# Patient Record
Sex: FEMALE | URBAN - METROPOLITAN AREA
[De-identification: names, ages, dates, MRNs, and addresses within clinical notes are randomized per-mention and may not be internally consistent; named-entity substitution may affect disease eponyms.]

---

## 2022-08-10 ENCOUNTER — OFFICE VISIT (OUTPATIENT)
Dept: ORTHOPEDIC SURGERY | Age: 47
End: 2022-08-10
Payer: OTHER GOVERNMENT

## 2022-08-10 VITALS — HEIGHT: 64 IN | BODY MASS INDEX: 29.88 KG/M2 | WEIGHT: 175 LBS

## 2022-08-10 DIAGNOSIS — M54.16 LUMBAR RADICULOPATHY: ICD-10-CM

## 2022-08-10 DIAGNOSIS — R52 PAIN: Primary | ICD-10-CM

## 2022-08-10 PROCEDURE — 99204 OFFICE O/P NEW MOD 45 MIN: CPT | Performed by: PHYSICIAN ASSISTANT

## 2022-08-10 NOTE — PROGRESS NOTES
New Patient: LUMBAR SPINE    Referring Provider:  No ref. provider found    CHIEF COMPLAINT:    Chief Complaint   Patient presents with    Back Pain     lumbar         HISTORY OF PRESENT ILLNESS:       Ms. Munira Alonzo  is a pleasant 50 y.o. female here for consultation regarding her LBP and left leg pain. She states her pain began when she slipped down carpeted steps impacting her left buttock onto a step on 6/9/2022. Avelina Baxter Her pain has steadily continued since then. She rates her back pain 0/10, left buttock pain 3/10 with paresthesias into her left leg . She describes the pain as intermittent. Pain is worse with prolonged sitting, rising from a seated position and improved some with changing positions, standing, walking, lying down. The leg pain radiates down the anterior lateral aspect of her left leg to her foot. She has numbness and tingling in her left leg. She denies weakness of her left or right leg and denies bowel or bladder dysfunction. Pain minimally interferes with her sleep. Patient has recently seen her PCP and was placed on a prednisone taper along with a muscle relaxant which has improved her symptoms. Pain Assessment  Location of Pain: Back  Severity of Pain: 0]  Current/Past Treatment:   Physical Therapy: None  Chiropractic: 2 visits with some benefit  Injection: None  Medications: Prednisone taper and muscle relaxant    Past Medical History:   History reviewed. No pertinent past medical history. Past Surgical History:     History reviewed. No pertinent surgical history. Current Medications:   No current outpatient medications on file. Allergies:  Patient has no known allergies. Social History:    reports that she has never smoked. She has never used smokeless tobacco.    Family History:   History reviewed. No pertinent family history.     REVIEW OF SYSTEMS: Full ROS noted & scanned   CONSTITUTIONAL: Denies unexplained weight loss, fevers, chills or fatigue  NEUROLOGICAL: Denies not show any tenderness, deformity or injury. Range of motion is unremarkable. There is no gross instability. There are no rashes, ulcerations or lesions. Strength and tone are normal.  LEFT LOWER EXTREMITY:  Inspection/examination of the left lower extremity does not show any tenderness, deformity or injury. Range of motion is unremarkable. There is no gross instability. There are no rashes, ulcerations or lesions. Strength and tone are normal.    Diagnostic Testing:    X-rays: 2 views of the lumbar spine that were obtained today in the office and independently reviewed with the patient's shows well-maintained lumbar lordosis with only mild spondylosis. There is mild disc space narrowing at L4-5 and L5-S1. Impression:   Lumbar radiculopathy    1. Pain        Plan:      We discussed the diagnosis and treatment options including observation, additional oral steroids, physical therapy, epidural injections and additional imaging. She wishes to proceed with patient physical therapy for lumbar flexibility, pelvic stabilization, and core exercises. Modalities of choice will be added to include dry needling. If the patient has no significant improvement with these conservative treatment she will contact the office for scheduling of lumbar MRI. .    Follow up -as needed    Old records were reviewed.     Usman Barron PA-C  Board certified by the [facility]

## 2022-08-15 LAB — MAMMOGRAPHY, EXTERNAL: NORMAL

## 2022-08-16 ENCOUNTER — HOSPITAL ENCOUNTER (OUTPATIENT)
Dept: PHYSICAL THERAPY | Age: 47
Setting detail: THERAPIES SERIES
Discharge: HOME | End: 2022-08-16
Payer: OTHER GOVERNMENT

## 2022-08-16 PROCEDURE — 97161 PT EVAL LOW COMPLEX 20 MIN: CPT

## 2022-08-16 PROCEDURE — 97112 NEUROMUSCULAR REEDUCATION: CPT

## 2022-08-16 PROCEDURE — 97110 THERAPEUTIC EXERCISES: CPT

## 2022-08-16 NOTE — FLOWSHEET NOTE
Manual: STM to gluts/ piriformis    Therapeutic Exercise and NMR EXR  [x] (53089) Provided verbal/tactile cueing for activities related to strengthening, flexibility, endurance, ROM  for improvements in proximal hip and core control with self care, mobility, lifting and ambulation.  [] (35494) Provided verbal/tactile cueing for activities related to improving balance, coordination, kinesthetic sense, posture, motor skill, proprioception  to assist with core control in self care, mobility, lifting, and ambulation. Therapeutic Activities:    [x] (96644 or 26308) Provided verbal/tactile cueing for activities related to improving balance, coordination, kinesthetic sense, posture, motor skill, proprioception and motor activation to allow for proper function  with self care and ADLs  [] (80892) Provided training and instruction to the patient for proper core and proximal hip recruitment and positioning with ambulation re-education     Home Exercise Program:    [x] (34306) Reviewed/Progressed HEP activities related to strengthening, flexibility, endurance, ROM of core, proximal hip and LE for functional self-care, mobility, lifting and ambulation   [] (81737) Reviewed/Progressed HEP activities related to improving balance, coordination, kinesthetic sense, posture, motor skill, proprioception of core, proximal hip and LE for self care, mobility, lifting, and ambulation      Manual Treatments:  PROM / STM / Oscillations-Mobs:  G-I, II, III, IV (PA's, Inf., Post.)  [x] (82332) Provided manual therapy to mobilize proximal hip and LS spine soft tissue/joints for the purpose of modulating pain, promoting relaxation,  increasing ROM, reducing/eliminating soft tissue swelling/inflammation/restriction, improving soft tissue extensibility and allowing for proper ROM for normal function with self care, mobility, lifting and ambulation.        Modalities:     [] GAME READY (VASO)- for significant edema, swelling, pain control. Charges:  Timed Code Treatment Minutes: 25   Total Treatment Minutes:  40   BWC:  TE TIME:  NMR TIME:  MANUAL TIME:  TA  UNTIMED MINUTES:  Medicare Total:   15  10      15        [x] EVAL (LOW) 72943 (typically 20 minutes face-to-face)  [] EVAL (MOD) 25987 (typically 30 minutes face-to-face)  [] EVAL (HIGH) 01736 (typically 45 minutes face-to-face)  [] RE-EVAL     [x] BI(46158) x     [] IONTO  [x] NMR (28841) x     [] VASO  [] Manual (20774) x     [] Dry Needle:  [] TA x      [] Mech Traction (62897)  [] ES(attended) (73513)      [] ES (un) (19936):         GOALS:     Patient stated goal: Reduce pain     Therapist goals for Patient:  Short Term Goals: To be achieved in: 2 weeks  1. Independent in HEP and progression per patient tolerance, in order to prevent re-injury. [] Progressing: [] Met: [] Not Met: [] Adjusted  2. Patient will have a decrease in pain to facilitate improvement in movement, function, and ADLs as indicated by Functional Deficits. [] Progressing: [] Met: [] Not Met: [] Adjusted     Long Term Goals: To be achieved in: 8 weeks  1. Pt will improve FOTO to 72 or more, indicating improved functional capacity . [] Progressing: [] Met: [] Not Met: [] Adjusted  2. Patient will demonstrate increased AROM to WNL, good LS mobility, good hip ROM to allow for proper joint functioning as indicated by patients Functional Deficits. [] Progressing: [] Met: [] Not Met: [] Adjusted  3. Patient will demonstrate an increase in Strength of hip flexion/abduction to 5/5 to allow for proper functional mobility as indicated by patients Functional Deficits. [] Progressing: [] Met: [] Not Met: [] Adjusted  4. Patient will return to functional activities without increased symptoms or restriction.   [] Progressing: [] Met: [] Not Met: [] Adjusted  5. Pt(patient specific functional goal)    [] Progressing: [] Met: [] Not Met: [] Adjusted                ASSESSMENT:  See eval        Patient received education on their current pathology and how their condition effects them with their functional activities. Patient understood discussion and questions were answered. Patient understands their activity limitations and understands rational for treatment progression. Pt educated on plan of care and HEP, if worsening symptoms to d/c that exercise. PLAN: See eval  [x] Continue per plan of care [] Alter current plan (see comments above)  [] Plan of care initiated [] Hold pending MD visit [] Discharge      Electronically signed by:  Alberto Jara, PT    Note: If patient does not return for scheduled/ recommended follow up visits, this note will serve as a discharge from care along with most recent update on progress.

## 2022-08-16 NOTE — PROGRESS NOTES
[address] [place] [address] [facility], [address], [address] [place] [address] [address]  Phone: [phone]   Fax: [phone]    Date: 2022          Patient Name; :  Tamera Romano; 1975   Dx:  M54.16 (ICD-10-CM) - Lumbar radiculopathy      Physician: Blanca Gonzales PA-C        Total PT Visits:      Measures Previous Current   Pain (0-10)     FOTO (0-100)  High number is more function           Assessment:        Prognosis for POC: [] Good [] Fair  [] Poor      Patient requires continued skilled intervention: [] Yes  [] No        Plan & Recommendations:  [] Continue rehabilitation due to objective improvement and continued functional deficits with frequency and duration:   [] Progress toward  []GAP, []Work Conditioning, []Independent HEP   [] Discharge due to   [] All goals achieved, [] Maximized \"medical necessity\" [] No subjective or objective improvements      Electronically signed by:  Corinna De La Cruz, PT  Therapy Plan of Care Re-Certification  This patient has been re-evaluated for physical therapy services and for therapy to continue, Medicare, Medicaid and other insurances require periodic physician review of the treatment plan. Please review the above re-evaluation and verify that you agree with plan of care as established above by signing the attached document and return it to our office or note changes to established plan below  [] Follow treatment plan as above [] Discontinue physical therapy  [] Change plan to:                                 __________________________________________________    Physician Signature:____________________________________ Date:____________  By signing above, therapists plan is approved by physician    If you have any questions or concerns, please don't hesitate to call.   Thank you for your referral.

## 2022-08-16 NOTE — PLAN OF CARE
[x]Intermittent  []Radiating []Localized []other:     Numbness/Tingling: Periodically into left leg. Occupation/School: No limitations    Living Status/Prior Level of Function: Independent with ADLs and IADLs. C-SSRS Triggered by Intake questionnaire (Past 2 wk assessment):   [x] No, Questionnaire did not trigger screening.   [] Yes, Patient intake triggered further evaluation      [] C-SSRS Screening completed  [] PCP notified via Plan of Care  [] Emergency services notified     OBJECTIVE:     ROM  Comments   Standing Lumbar Flexion To ankles    Supine Lumbar flexion          Standing Lumbar Extension     Prone Lumbar Extension       ROM RIGHT LEFT Comments   Lumbar Side Bend To knee To knee    Hip Flexion      Hip Abd      Hip ER      Hip IR      Hip Extension            Knee Ext      Knee Flex            Hamstring Flex                    Strength RIGHT LEFT Comments   Seated Hip Abduction      SL Hip Abduction  4+    Prone Hip Extension      Hip IR      Hip ER               Myotomes RIGHT LEFT Comments   Hip flexion (L1-L2) 5 4+    Knee extension (L2-L4) 5 5    Dorsiflexion (L4-L5) 5 5    Great Toe Ext (L5) 5 5    Ankle Eversion (S1-S2)      Ankle PF(S1-S2) 5 5        Neural dynamic tension testing Normal Abnormal Comments   Slump Test  - Degree of knee flexion:       SLR       0-30      30-70      Femoral nerve (L2-4)        Reflexes RIGHT LEFT Comments   S1-2 Seated achilles 1+ 1+    S1-2 Prone knee bend      L3-4 Patellar tendon 1+ 1+    C5-6 Biceps      C6 Brachioradialis      C7-8 Triceps      Clonus      Babinski      Powell's        Joint mobility:    [x]Normal    []Hypo   []Hyper    Palpation: ttp mid glute, left SI joint    Functional Mobility/Transfers: Independent    Posture:  WNL    Gait: (include devices/WB status) WNL    Bandages/Dressings/Incisions: NA    Orthopedic Special Tests:    Normal Abnormal N/A Comments   Toe walk   x      Heel Walk x      Fwd Bend-aberrant        Trendelenburg Ashley COTTON/Nathan x      FADIR x      Hip scour       SLR x      Crossed SLR x      Supine to sit       Hip thrust x      SI distraction/compression x      PA/Spring x      Prone Instability test       Prone knee bend       Sacral Spring/thrust                  [x] Patient history, allergies, meds reviewed. Medical chart reviewed. See intake form. Review Of Systems (ROS):  [x]Performed Review of systems (Integumentary, CardioPulmonary, Neurological) by intake and observation. Intake form has been scanned into medical record. Patient has been instructed to contact their primary care physician regarding ROS issues if not already being addressed at this time.       Co-morbidities/Complexities (which will affect course of rehabilitation):   [x]None           Arthritic conditions   []Rheumatoid arthritis (M05.9)  []Osteoarthritis (M19.91)   Cardiovascular conditions   []Hypertension (I10)  []Hyperlipidemia (E78.5)  []Angina pectoris (I20)  []Atherosclerosis (I70)   Musculoskeletal conditions   []Disc pathology   []Congenital spine pathologies   []Prior surgical intervention  []Osteoporosis (M81.8)  []Osteopenia (M85.8)   Endocrine conditions   []Hypothyroid (E03.9)  []Hyperthyroid Gastrointestinal conditions   []Constipation (A56.85)   Metabolic conditions   []Morbid obesity (E66.01)  []Diabetes type 1(E10.65) or 2 (E11.65)   []Neuropathy (G60.9)     Pulmonary conditions   []Asthma (J45)  []Coughing   []COPD (J44.9)   Psychological Disorders  []Anxiety (F41.9)  []Depression (F32.9)   []Other:   []Other:           Barriers to/and or personal factors that will affect rehab potential:              []Age  []Sex              []Motivation/Lack of Motivation                        []Co-Morbidities              []Cognitive Function, education/learning barriers              []Environmental, home barriers              []profession/work barriers  []past PT/medical experience  []other:  Justification: None    Falls Risk Assessment (30 days):   [x] Falls Risk assessed and no intervention required. [] Falls Risk assessed and Patient requires intervention due to being higher risk   TUG score (>12s at risk):     [] Falls education provided, including      Functional Questionnaire: FOTO 67    ASSESSMENT: Vasquez Barrett is a 50 y.o. female reporting to OP PT with c/c of LBP and left leg pain which has been occurring since 6/8/22. Pt is noted to have relatively good ROM and mild reduction in hip stength with several tender point around mid glutes and SI joint. Functional Impairments:     []Noted lumbar/proximal hip hypomobility   []Noted lumbosacral and/or generalized hypermobility   []Decreased Lumbosacral/hip/LE functional ROM   [x]Decreased core/proximal hip strength and neuromuscular control    []Decreased LE functional strength    []Abnormal reflexes/sensation/myotomal/dermatomal deficits  []Reduced balance/proprioceptive control    []other:      Functional Activity Limitations (from functional questionnaire and intake)   []Reduced ability to tolerate prolonged functional positions   []Reduced ability or difficulty with changes of positions or transfers between positions   []Reduced ability to maintain good posture and demonstrate good body mechanics with sitting, bending, and lifting   []Reduced ability to sleep   [x] Reduced ability or tolerance with driving and/or computer work   []Reduced ability to perform lifting, reaching, carrying tasks   []Reduced ability to squat   []Reduced ability to forward bend   [x]Reduced ability to ambulate prolonged functional periods/distances/surfaces   []Reduced ability to ascend/descend stairs   []other:       Participation Restrictions   []Reduced participation in self care activities   []Reduced participation in home management activities   []Reduced participation in work activities   [x]Reduced participation in social activities.    []Reduced participation in sport/recreational activities. Classification:   [x]Signs/symptoms consistent with Lumbar instability/stabilization subgroup. []Signs/symptoms consistent with Lumbar mobilization/manipulation subgroup, myotomes and dermatomes intact. Meets manipulation criteria. [x]Signs/symptoms consistent with Lumbar direction specific/centralization subgroup   []Signs/symptoms consistent with Lumbar traction subgroup     []Signs/symptoms consistent with lumbar facet dysfunction   []Signs/symptoms consistent with lumbar stenosis type dysfunction   []Signs/symptoms consistent with nerve root involvement including myotome & dermatome dysfunction   []Signs/symptoms consistent with post-surgical status including: decreased ROM, strength and function.    []signs/symptoms consistent with pathology which may benefit from Dry needling     []other:      Prognosis/Rehab Potential:      []Excellent   [x]Good    []Fair   []Poor    Tolerance of evaluation/treatment:    []Excellent   [x]Good    []Fair   []Poor     Physical Therapy Evaluation Complexity Justification  [] A history of present problem with:  [] no personal factors and/or comorbidities that impact the plan of care;  [x]1-2 personal factors and/or comorbidities that impact the plan of care  []3 personal factors and/or comorbidities that impact the plan of care  [] An examination of body systems using standardized tests and measures addressing any of the following: body structures and functions (impairments), activity limitations, and/or participation restrictions;:  [x] a total of 1-2 or more elements   [] a total of 3 or more elements   [] a total of 4 or more elements   [] A clinical presentation with:  [x] stable and/or uncomplicated characteristics   [] evolving clinical presentation with changing characteristics  [] unstable and unpredictable characteristics;   [] Clinical decision making of [] low, [] moderate, [] high complexity using standardized patient assessment instrument and/or measurable assessment of functional outcome. [x] EVAL (LOW) 00934 (typically 20 minutes face-to-face)  [] EVAL (MOD) 39779 (typically 30 minutes face-to-face)  [] EVAL (HIGH) 36499 (typically 45 minutes face-to-face)  [] RE-EVAL     PLAN: Begin PT focusing on: proximal hip mobilizations, LB mobs, LB core activation, proximal hip activation, and HEP    Frequency/Duration:  2 days per week for 8 Weeks:  Interventions:  [x]  Therapeutic exercise including: strength training, ROM, for LE, Glutes and core   [x]  NMR activation and proprioception for glutes , LE and Core   [x]  Manual therapy as indicated for Hip complex, LE and spine to include: Dry Needling/IASTM, STM, PROM, Gr I-IV mobilizations, manipulation. [x]  Modalities as needed that may include: thermal agents, E-stim, Biofeedback, US, iontophoresis as indicated  [x]  Patient education on joint protection, postural re-education, activity modification, progression of HEP. HEP instruction: [id number]      GOALS:  Patient stated goal: Reduce pain    Therapist goals for Patient:   Short Term Goals: To be achieved in: 2 weeks  1. Independent in HEP and progression per patient tolerance, in order to prevent re-injury. [] Progressing: [] Met: [] Not Met: [] Adjusted  2. Patient will have a decrease in pain to facilitate improvement in movement, function, and ADLs as indicated by Functional Deficits. [] Progressing: [] Met: [] Not Met: [] Adjusted    Long Term Goals: To be achieved in: 8 weeks  1. Pt will improve FOTO to 72 or more, indicating improved functional capacity . [] Progressing: [] Met: [] Not Met: [] Adjusted  2. Patient will demonstrate increased AROM to WNL, good LS mobility, good hip ROM to allow for proper joint functioning as indicated by patients Functional Deficits. [] Progressing: [] Met: [] Not Met: [] Adjusted  3.  Patient will demonstrate an increase in Strength of hip flexion/abduction to 5/5 to allow for proper functional mobility as indicated by patients Functional Deficits. [] Progressing: [] Met: [] Not Met: [] Adjusted  4. Patient will return to functional activities without increased symptoms or restriction.    [] Progressing: [] Met: [] Not Met: [] Adjusted  5. Pt(patient specific functional goal)    [] Progressing: [] Met: [] Not Met: [] Adjusted     Electronically signed by:  Ayaan Valentin PT

## 2022-08-25 ENCOUNTER — HOSPITAL ENCOUNTER (OUTPATIENT)
Dept: PHYSICAL THERAPY | Age: 47
Setting detail: THERAPIES SERIES
Discharge: HOME | End: 2022-08-25
Payer: OTHER GOVERNMENT

## 2022-08-25 PROCEDURE — 97112 NEUROMUSCULAR REEDUCATION: CPT

## 2022-08-25 PROCEDURE — 97110 THERAPEUTIC EXERCISES: CPT

## 2022-08-25 PROCEDURE — 97140 MANUAL THERAPY 1/> REGIONS: CPT

## 2022-08-25 NOTE — FLOWSHEET NOTE
[facility] [facility], [address] [place] [address] [address]  Phone: [phone]   Fax:     [phone]      Physical Therapy Treatment Note/ Progress Report:           Date:  2022    Patient Name:  Sonia Bowie    :  1975  MRN: [id number]  Restrictions/Precautions:    Medical/Treatment Diagnosis Information:  Diagnosis: M54.16 (ICD-10-CM) - Lumbar radiculopathy  Treatment Diagnosis: LBP, SI joint pain  Insurance/Certification information:  [facility]   Physician Information:  Edd Staples PA-C  Has the plan of care been signed (Y/N):        []  Yes  [x]  No     Date of Patient follow up with Physician:     Assessment Summary: Oumar Almonte is a 50 y.o. female reporting to OP PT with c/c of LBP and left leg pain which has been occurring since 22. Pt is noted to have relatively good ROM and mild reduction in hip stength with several tender point around mid glutes and SI joint. If Yes:  Date Range for reporting period:  Beginning  22  Ending        Recertification will be due (POC Duration  / 90 days whichever is less): 10/16/22        Visit # Insurance Allowable Auth Required   In Person  ? []  Yes     []  No    Tele Health   []  Yes     []  No    Total 2           Functional Scale:  FOTO 67   Date assessed:       Latex Allergy:  [x]NO      []YES  Preferred Language for Healthcare:   [x]English       []other:      Pain level:  2/10       SUBJECTIVE:  Pt states Still getting some pain around tail bone and around hip. A couple of the exercises increase tingling. OBJECTIVE:   Ttp SI joint, greater trochanter, gluts.            RESTRICTIONS/PRECAUTIONS: NOne    Exercises/Interventions: HEP code: [id number]  Therapeutic Ex (44899) HEP 22    Warm-up       Rec bike    Trial traction next          TABLE       DKTC x x10 --    Glut sets x x15 --    Bridge x x15 x15    Eccentric bridge   X10 B    Adduction isometric x x15 x15    Bridge w/ adduction x x10 --    Cross body piriformis stretch R 30\"x B --    PPU x x10 x15    SL CS isometric   30\"x4    SL Reverse CS   x20                                              STANDING       Lateral step up   X15 B, 4\"    Paloff Press   X10 B, 5\" BTB                                  Manual: STM to gluts/ piriformis, SI joint, stick rolling, Prone IR/ER hip stretch, Prone quad stretch 15'    Therapeutic Exercise and NMR EXR  [x] (12954) Provided verbal/tactile cueing for activities related to strengthening, flexibility, endurance, ROM  for improvements in proximal hip and core control with self care, mobility, lifting and ambulation.  [] (70138) Provided verbal/tactile cueing for activities related to improving balance, coordination, kinesthetic sense, posture, motor skill, proprioception  to assist with core control in self care, mobility, lifting, and ambulation.      Therapeutic Activities:    [x] (09246 or 38234) Provided verbal/tactile cueing for activities related to improving balance, coordination, kinesthetic sense, posture, motor skill, proprioception and motor activation to allow for proper function  with self care and ADLs  [] (16991) Provided training and instruction to the patient for proper core and proximal hip recruitment and positioning with ambulation re-education     Home Exercise Program:    [x] (14711) Reviewed/Progressed HEP activities related to strengthening, flexibility, endurance, ROM of core, proximal hip and LE for functional self-care, mobility, lifting and ambulation   [] (46623) Reviewed/Progressed HEP activities related to improving balance, coordination, kinesthetic sense, posture, motor skill, proprioception of core, proximal hip and LE for self care, mobility, lifting, and ambulation      Manual Treatments:  PROM / STM / Oscillations-Mobs:  G-I, II, III, IV (PA's, Inf., Post.)  [x] (33072) Provided manual therapy to mobilize proximal hip and LS spine soft tissue/joints for the purpose of modulating pain, promoting relaxation,  increasing ROM, reducing/eliminating soft tissue swelling/inflammation/restriction, improving soft tissue extensibility and allowing for proper ROM for normal function with self care, mobility, lifting and ambulation. Modalities:     [] GAME READY (VASO)- for significant edema, swelling, pain control. Charges:  Timed Code Treatment Minutes: 40   Total Treatment Minutes:  50   BWC:  TE TIME:  NMR TIME:  MANUAL TIME:  TA  UNTIMED MINUTES:  Medicare Total:   15  10  15    15        [] EVAL (LOW) 10994 (typically 20 minutes face-to-face)  [] EVAL (MOD) 00767 (typically 30 minutes face-to-face)  [] EVAL (HIGH) 63163 (typically 45 minutes face-to-face)  [] RE-EVAL     [x] DF(80229) 1     [] IONTO  [x] NMR (64006) 1     [] VASO  [x] Manual (16934) 1     [] Dry Needle:  [] TA x      [] Mech Traction (54179)  [] ES(attended) (64354)      [] ES (un) (01158):         GOALS:     Patient stated goal: Reduce pain     Therapist goals for Patient:  Short Term Goals: To be achieved in: 2 weeks  1. Independent in HEP and progression per patient tolerance, in order to prevent re-injury. [] Progressing: [] Met: [] Not Met: [] Adjusted  2. Patient will have a decrease in pain to facilitate improvement in movement, function, and ADLs as indicated by Functional Deficits. [] Progressing: [] Met: [] Not Met: [] Adjusted     Long Term Goals: To be achieved in: 8 weeks  1. Pt will improve FOTO to 72 or more, indicating improved functional capacity . [] Progressing: [] Met: [] Not Met: [] Adjusted  2. Patient will demonstrate increased AROM to WNL, good LS mobility, good hip ROM to allow for proper joint functioning as indicated by patients Functional Deficits. [] Progressing: [] Met: [] Not Met: [] Adjusted  3.  Patient will demonstrate an increase in Strength of hip flexion/abduction to 5/5 to allow for proper functional mobility as indicated by patients Functional Deficits. [] Progressing: [] Met: [] Not Met: [] Adjusted  4. Patient will return to functional activities without increased symptoms or restriction. [] Progressing: [] Met: [] Not Met: [] Adjusted  5. Pt(patient specific functional goal)    [] Progressing: [] Met: [] Not Met: [] Adjusted                ASSESSMENT:  Pt celi session well. Gets periodic tingling into left thigh through session but not with repeatable motions. Patient received education on their current pathology and how their condition effects them with their functional activities. Patient understood discussion and questions were answered. Patient understands their activity limitations and understands rational for treatment progression. Pt educated on plan of care and HEP, if worsening symptoms to d/c that exercise. PLAN: See eval  [x] Continue per plan of care [] Alter current plan (see comments above)  [] Plan of care initiated [] Hold pending MD visit [] Discharge      Electronically signed by:  Kathrine Chao PT    Note: If patient does not return for scheduled/ recommended follow up visits, this note will serve as a discharge from care along with most recent update on progress.

## 2022-08-31 ENCOUNTER — HOSPITAL ENCOUNTER (OUTPATIENT)
Dept: PHYSICAL THERAPY | Age: 47
Setting detail: THERAPIES SERIES
Discharge: HOME | End: 2022-08-31
Payer: OTHER GOVERNMENT

## 2022-08-31 PROCEDURE — 97110 THERAPEUTIC EXERCISES: CPT

## 2022-08-31 PROCEDURE — 97112 NEUROMUSCULAR REEDUCATION: CPT

## 2022-08-31 PROCEDURE — 97140 MANUAL THERAPY 1/> REGIONS: CPT

## 2022-08-31 PROCEDURE — 97012 MECHANICAL TRACTION THERAPY: CPT

## 2022-08-31 NOTE — FLOWSHEET NOTE
[address] [place] [address] [facility], [address] [place] [address] [address]  Phone: [phone]   Fax:     [phone]      Physical Therapy Treatment Note/ Progress Report:           Date:  2022    Patient Name:  Eric Loyd    :  1975  MRN: [id number]  Restrictions/Precautions:    Medical/Treatment Diagnosis Information:  Diagnosis: M54.16 (ICD-10-CM) - Lumbar radiculopathy  Treatment Diagnosis: LBP, SI joint pain  Insurance/Certification information:  [facility]   Physician Information:  Josemanuel Topete PA-C  Has the plan of care been signed (Y/N):        []  Yes  [x]  No     Date of Patient follow up with Physician:     Assessment Summary: Alfred Bueno is a 50 y.o. female reporting to OP PT with c/c of LBP and left leg pain which has been occurring since 22. Pt is noted to have relatively good ROM and mild reduction in hip stength with several tender point around mid glutes and SI joint. If Yes:  Date Range for reporting period:  Beginning  22  Ending  27      Recertification will be due (POC Duration  / 90 days whichever is less): 10/16/22        Visit # Insurance Allowable Auth Required   In Person  ? []  Yes     []  No    Tele Health   []  Yes     []  No    Total 3           Functional Scale:  FOTO 67   Date assessed:       Latex Allergy:  [x]NO      []YES  Preferred Language for Healthcare:   [x]English       []other:      Pain level:  2/10       SUBJECTIVE:  Pt states Still getting some pain around tail bone and around hip. A couple of the exercises increase tingling. OBJECTIVE:   Ttp SI joint, greater trochanter, gluts. RESTRICTIONS/PRECAUTIONS: None    Exercises/Interventions: HEP code: [id number]  Therapeutic Ex (96788) HEP 22   Warm-up       Rec bike    5'.  Lv 2          TABLE       DKTC x x10 -- X10, 10\"   Glut sets x x15 --    Bridge x x15 x15    Eccentric bridge   X10 B    Adduction isometric x x15 x15 x20   Bridge w/ adduction x x10 --    Cross body piriformis stretch R 30\"x B --    Figure 4 stretch x   15\"x4   PPU x x10 x15 10x2   Lumbar rotations w/ SB    X15 B   Bridge w/ SB    x10   SL CS isometric   30\"x4    SL Reverse CS   x20                                              STANDING       Wedge gastroc stretch    1'   Lateral step up   X15 B, 4\"    Paloff Press x  X10 B, 5\" BTB                                  Manual: STM to gluts/ piriformis, SI joint, s, Prone IR/ER hip stretch, Prone quad stretch 10'    Mechanical Lumbar Traction: With the patient lying in hook-lying position holding shut-off switch the traction unit was pre-set at 80 lbs. / 70lbs of on/off cycle. The on/off time was pre-set at 30 seconds / 10 seconds. The traction unit was set at a duration of 10 minutes. The target goal of modality is to relieve intradiscal pressure and reduce radicular symptoms. Therapeutic Exercise and NMR EXR  [x] (77139) Provided verbal/tactile cueing for activities related to strengthening, flexibility, endurance, ROM  for improvements in proximal hip and core control with self care, mobility, lifting and ambulation.  [] (75531) Provided verbal/tactile cueing for activities related to improving balance, coordination, kinesthetic sense, posture, motor skill, proprioception  to assist with core control in self care, mobility, lifting, and ambulation.      Therapeutic Activities:    [x] (90309 or 74747) Provided verbal/tactile cueing for activities related to improving balance, coordination, kinesthetic sense, posture, motor skill, proprioception and motor activation to allow for proper function  with self care and ADLs  [] (45515) Provided training and instruction to the patient for proper core and proximal hip recruitment and positioning with ambulation re-education     Home Exercise Program:    [x] (03767) Reviewed/Progressed HEP activities related to strengthening, flexibility, endurance, ROM of core, proximal hip and LE for functional self-care, mobility, lifting and ambulation   [] (90488) Reviewed/Progressed HEP activities related to improving balance, coordination, kinesthetic sense, posture, motor skill, proprioception of core, proximal hip and LE for self care, mobility, lifting, and ambulation      Manual Treatments:  PROM / STM / Oscillations-Mobs:  G-I, II, III, IV (PA's, Inf., Post.)  [x] (44351) Provided manual therapy to mobilize proximal hip and LS spine soft tissue/joints for the purpose of modulating pain, promoting relaxation,  increasing ROM, reducing/eliminating soft tissue swelling/inflammation/restriction, improving soft tissue extensibility and allowing for proper ROM for normal function with self care, mobility, lifting and ambulation. Modalities:     [] GAME READY (VASO)- for significant edema, swelling, pain control. Charges:  Timed Code Treatment Minutes: 40   Total Treatment Minutes:  50   BWC:  TE TIME:  NMR TIME:  MANUAL TIME:  TA  UNTIMED MINUTES:  Medicare Total:   20  10  10    10        [] EVAL (LOW) 83966 (typically 20 minutes face-to-face)  [] EVAL (MOD) 66566 (typically 30 minutes face-to-face)  [] EVAL (HIGH) 11725 (typically 45 minutes face-to-face)  [] RE-EVAL     [x] PB(04025) 1     [] IONTO  [x] NMR (21678) 1     [] VASO  [x] Manual (75740) 1     [] Dry Needle:  [] TA x      [x] Mech Traction (26908)  [] ES(attended) (70352)      [] ES (un) (68761):         GOALS:     Patient stated goal: Reduce pain     Therapist goals for Patient:  Short Term Goals: To be achieved in: 2 weeks  1. Independent in HEP and progression per patient tolerance, in order to prevent re-injury. [] Progressing: [] Met: [] Not Met: [] Adjusted  2. Patient will have a decrease in pain to facilitate improvement in movement, function, and ADLs as indicated by Functional Deficits. [] Progressing: [] Met: [] Not Met: [] Adjusted     Long Term Goals: To be achieved in: 8 weeks  1.

## 2022-09-08 ENCOUNTER — HOSPITAL ENCOUNTER (OUTPATIENT)
Dept: PHYSICAL THERAPY | Age: 47
Setting detail: THERAPIES SERIES
Discharge: HOME | End: 2022-09-08
Payer: OTHER GOVERNMENT

## 2022-09-08 NOTE — FLOWSHEET NOTE
[facility], [address] [place] [address], [address] [place] [address] [address]  Phone: [phone]   Fax:     [phone]    Physical Therapy  Cancellation/No-show Note  Patient Name:  Zachariah Mendiola  :  1975   Date:  2022    Cancelled visits to date: 0  No-shows to date: 1    For today's appointment patient:  []  Cancelled  []  Rescheduled appointment  [x]  No-show     Reason given by patient:  []  Patient ill  []  Conflicting appointment  []  No transportation    []  Conflict with work  []  No reason given  []  Other:     Comments:      Phone call information:   []  Phone call made today to patient at am/pm at the number provided:      []  Patient answered, conversation as follows:    []  Patient did not answer, message left as follows:  []  Phone call not needed - pt contacted us to cancel and provided reason for cancellation.      Electronically signed by:  Vinayak Quiñones PT, PT

## 2022-09-09 ENCOUNTER — HOSPITAL ENCOUNTER (OUTPATIENT)
Dept: PHYSICAL THERAPY | Age: 47
Setting detail: THERAPIES SERIES
Discharge: HOME | End: 2022-09-09
Payer: OTHER GOVERNMENT

## 2022-09-09 PROCEDURE — 97012 MECHANICAL TRACTION THERAPY: CPT

## 2022-09-09 PROCEDURE — 97110 THERAPEUTIC EXERCISES: CPT

## 2022-09-09 PROCEDURE — 97140 MANUAL THERAPY 1/> REGIONS: CPT

## 2022-09-09 NOTE — FLOWSHEET NOTE
--     Cross body piriformis stretch R --     Figure 4 stretch x  15\"x4    PPU x x15 10x2 --   Lumbar rotations w/ SB   X15 B X15 B   Bridge w/ SB   x10 x10   SL CS isometric  30\"x4     SL Reverse CS  x20     Child pose                                          STANDING       Wedge gastroc stretch   1' 1'   Lateral step up  X15 B, 4\"     Paloff Press x X10 B, 5\" BTB     SEMAJ Abduction    X15 B, 45#   SEMAJ Extension    X15 B, 45#                   Manual: STM to gluts/ piriformis, SI joint, s, Prone IR/ER hip stretch, Prone quad stretch 10'    Mechanical Lumbar Traction: With the patient lying in hook-lying position holding shut-off switch the traction unit was pre-set at 80 lbs. / 70lbs of on/off cycle. The on/off time was pre-set at 30 seconds / 10 seconds. The traction unit was set at a duration of 10 minutes. The target goal of modality is to relieve intradiscal pressure and reduce radicular symptoms. Therapeutic Exercise and NMR EXR  [x] (09107) Provided verbal/tactile cueing for activities related to strengthening, flexibility, endurance, ROM  for improvements in proximal hip and core control with self care, mobility, lifting and ambulation.  [] (78735) Provided verbal/tactile cueing for activities related to improving balance, coordination, kinesthetic sense, posture, motor skill, proprioception  to assist with core control in self care, mobility, lifting, and ambulation.      Therapeutic Activities:    [x] (48622 or 91875) Provided verbal/tactile cueing for activities related to improving balance, coordination, kinesthetic sense, posture, motor skill, proprioception and motor activation to allow for proper function  with self care and ADLs  [] (95283) Provided training and instruction to the patient for proper core and proximal hip recruitment and positioning with ambulation re-education     Home Exercise Program:    [x] (94217) Reviewed/Progressed HEP activities related to strengthening, flexibility, endurance, ROM of core, proximal hip and LE for functional self-care, mobility, lifting and ambulation   [] (20117) Reviewed/Progressed HEP activities related to improving balance, coordination, kinesthetic sense, posture, motor skill, proprioception of core, proximal hip and LE for self care, mobility, lifting, and ambulation      Manual Treatments:  PROM / STM / Oscillations-Mobs:  G-I, II, III, IV (PA's, Inf., Post.)  [x] (19626) Provided manual therapy to mobilize proximal hip and LS spine soft tissue/joints for the purpose of modulating pain, promoting relaxation,  increasing ROM, reducing/eliminating soft tissue swelling/inflammation/restriction, improving soft tissue extensibility and allowing for proper ROM for normal function with self care, mobility, lifting and ambulation. Modalities:     [] GAME READY (VASO)- for significant edema, swelling, pain control. Charges:  Timed Code Treatment Minutes: 35   Total Treatment Minutes:  45   BWC:  TE TIME:  NMR TIME:  MANUAL TIME:  TA  UNTIMED MINUTES:  Medicare Total:   25    10    10        [] EVAL (LOW) 47505 (typically 20 minutes face-to-face)  [] EVAL (MOD) 06441 (typically 30 minutes face-to-face)  [] EVAL (HIGH) 98972 (typically 45 minutes face-to-face)  [] RE-EVAL     [x] QM(88474) 1     [] IONTO  [] NMR (15460) 1     [] VASO  [x] Manual (36885) 1     [] Dry Needle:  [] TA x      [x] Mech Traction (53188)  [] ES(attended) (58501)      [] ES (un) (28609):         GOALS:     Patient stated goal: Reduce pain     Therapist goals for Patient:  Short Term Goals: To be achieved in: 2 weeks  1. Independent in HEP and progression per patient tolerance, in order to prevent re-injury. [] Progressing: [] Met: [] Not Met: [] Adjusted  2. Patient will have a decrease in pain to facilitate improvement in movement, function, and ADLs as indicated by Functional Deficits. [] Progressing: [] Met: [] Not Met: [] Adjusted     Long Term Goals:  To be achieved in: 8 weeks  1. Pt will improve FOTO to 72 or more, indicating improved functional capacity . [] Progressing: [] Met: [] Not Met: [] Adjusted  2. Patient will demonstrate increased AROM to WNL, good LS mobility, good hip ROM to allow for proper joint functioning as indicated by patients Functional Deficits. [] Progressing: [] Met: [] Not Met: [] Adjusted  3. Patient will demonstrate an increase in Strength of hip flexion/abduction to 5/5 to allow for proper functional mobility as indicated by patients Functional Deficits. [] Progressing: [] Met: [] Not Met: [] Adjusted  4. Patient will return to functional activities without increased symptoms or restriction. [] Progressing: [] Met: [] Not Met: [] Adjusted  5. Pt(patient specific functional goal)    [] Progressing: [] Met: [] Not Met: [] Adjusted                ASSESSMENT:  Pt celi session well. Appeared to benefit from traction last visit, continue with current poc. Patient received education on their current pathology and how their condition effects them with their functional activities. Patient understood discussion and questions were answered. Patient understands their activity limitations and understands rational for treatment progression. Pt educated on plan of care and HEP, if worsening symptoms to d/c that exercise. PLAN: See eval  [x] Continue per plan of care [] Alter current plan (see comments above)  [] Plan of care initiated [] Hold pending MD visit [] Discharge      Electronically signed by:  Rhea Farmer PT    Note: If patient does not return for scheduled/ recommended follow up visits, this note will serve as a discharge from care along with most recent update on progress.

## 2022-09-19 ENCOUNTER — HOSPITAL ENCOUNTER (OUTPATIENT)
Dept: PHYSICAL THERAPY | Age: 47
Setting detail: THERAPIES SERIES
Discharge: HOME | End: 2022-09-19
Payer: OTHER GOVERNMENT

## 2022-09-19 PROCEDURE — 97140 MANUAL THERAPY 1/> REGIONS: CPT

## 2022-09-19 PROCEDURE — 97110 THERAPEUTIC EXERCISES: CPT

## 2022-09-19 PROCEDURE — 97012 MECHANICAL TRACTION THERAPY: CPT

## 2022-09-19 NOTE — FLOWSHEET NOTE
[address] [place] [address] and [facility], [address] [place] [address] [address]  Phone: [phone]   Fax:     [phone]      Physical Therapy Treatment Note/ Progress Report:           Date:  2022    Patient Name:  Mariza Zepeda    :  1975  MRN: [id number]  Restrictions/Precautions:    Medical/Treatment Diagnosis Information:  Diagnosis: M54.16 (ICD-10-CM) - Lumbar radiculopathy  Treatment Diagnosis: LBP, SI joint pain  Insurance/Certification information:  [facility]   Physician Information:  Jorge Alberto Petit PA-C  Has the plan of care been signed (Y/N):        []  Yes  [x]  No     Date of Patient follow up with Physician:     Assessment Summary: John Rodriguez is a 50 y.o. female reporting to OP PT with c/c of LBP and left leg pain which has been occurring since 22. Pt is noted to have relatively good ROM and mild reduction in hip stength with several tender point around mid glutes and SI joint. If Yes:  Date Range for reporting period:  Beginning  22  Ending        Recertification will be due (POC Duration  / 90 days whichever is less): 10/16/22        Visit # Insurance Allowable Auth Required   In Person  ? []  Yes     []  No    [facility]   []  Yes     []  No    Total 5           Functional Scale:  FOTO 67   Date assessed:       Latex Allergy:  [x]NO      []YES  Preferred Language for Healthcare:   [x]English       []other:      Pain level:  1/10       SUBJECTIVE:  Pt states pain has been better overall. On a few periods of pain since Tuesday. OBJECTIVE:   Ttp SI joint, greater trochanter, gluts. RESTRICTIONS/PRECAUTIONS: None    Exercises/Interventions: HEP code: [id number]  Therapeutic Ex (33042) HEP 22   Warm-up        Rec bike   5'.  Lv 2 5', Lv 2 5', Lv 5           TABLE        DKTC x -- X10, 10\" X10, 10\" X10, 10\"   Glut sets x --   --   Bridge x x15      Eccentric bridge  X10 B re-education     Home Exercise Program:    [x] (28018) Reviewed/Progressed HEP activities related to strengthening, flexibility, endurance, ROM of core, proximal hip and LE for functional self-care, mobility, lifting and ambulation   [] (66661) Reviewed/Progressed HEP activities related to improving balance, coordination, kinesthetic sense, posture, motor skill, proprioception of core, proximal hip and LE for self care, mobility, lifting, and ambulation      Manual Treatments:  PROM / STM / Oscillations-Mobs:  G-I, II, III, IV (PA's, Inf., Post.)  [x] (13937) Provided manual therapy to mobilize proximal hip and LS spine soft tissue/joints for the purpose of modulating pain, promoting relaxation,  increasing ROM, reducing/eliminating soft tissue swelling/inflammation/restriction, improving soft tissue extensibility and allowing for proper ROM for normal function with self care, mobility, lifting and ambulation. Modalities:     [] GAME READY (VASO)- for significant edema, swelling, pain control. Charges:  Timed Code Treatment Minutes: 35   Total Treatment Minutes:  45   BWC:  TE TIME:  NMR TIME:  MANUAL TIME:  TA  UNTIMED MINUTES:  Medicare Total:   25    10    10        [] EVAL (LOW) 12072 (typically 20 minutes face-to-face)  [] EVAL (MOD) 78432 (typically 30 minutes face-to-face)  [] EVAL (HIGH) 98546 (typically 45 minutes face-to-face)  [] RE-EVAL     [x] BW(18163) 1     [] IONTO  [] NMR (92734) 1     [] VASO  [x] Manual (54750) 1     [] Dry Needle:  [] TA x      [x] Mech Traction (11841)  [] ES(attended) (36405)      [] ES (un) (90676):         GOALS:     Patient stated goal: Reduce pain     Therapist goals for Patient:  Short Term Goals: To be achieved in: 2 weeks  1. Independent in HEP and progression per patient tolerance, in order to prevent re-injury. [] Progressing: [x] Met: [] Not Met: [] Adjusted  2.  Patient will have a decrease in pain to facilitate improvement in movement, function, and ADLs as indicated by Functional Deficits. [] Progressing: [x] Met: [] Not Met: [] Adjusted     Long Term Goals: To be achieved in: 8 weeks  1. Pt will improve FOTO to 72 or more, indicating improved functional capacity . [x] Progressing: [] Met: [] Not Met: [] Adjusted  2. Patient will demonstrate increased AROM to WNL, good LS mobility, good hip ROM to allow for proper joint functioning as indicated by patients Functional Deficits. [] Progressing: [x] Met: [] Not Met: [] Adjusted  3. Patient will demonstrate an increase in Strength of hip flexion/abduction to 5/5 to allow for proper functional mobility as indicated by patients Functional Deficits. [x] Progressing: [] Met: [] Not Met: [] Adjusted  4. Patient will return to functional activities without increased symptoms or restriction. [x] Progressing: [] Met: [] Not Met: [] Adjusted               ASSESSMENT:  Pt celi session well. Mild improvement in functional questionnaire. Patient received education on their current pathology and how their condition effects them with their functional activities. Patient understood discussion and questions were answered. Patient understands their activity limitations and understands rational for treatment progression. Pt educated on plan of care and HEP, if worsening symptoms to d/c that exercise. PLAN: See eval  [x] Continue per plan of care [] Alter current plan (see comments above)  [] Plan of care initiated [] Hold pending MD visit [] Discharge      Electronically signed by:  Mark Gutiérrez PT    Note: If patient does not return for scheduled/ recommended follow up visits, this note will serve as a discharge from care along with most recent update on progress.

## 2022-09-29 ENCOUNTER — HOSPITAL ENCOUNTER (OUTPATIENT)
Dept: PHYSICAL THERAPY | Age: 47
Setting detail: THERAPIES SERIES
End: 2022-09-29
Payer: OTHER GOVERNMENT

## 2022-10-12 ENCOUNTER — OFFICE VISIT (OUTPATIENT)
Dept: ORTHOPEDIC SURGERY | Age: 47
End: 2022-10-12
Payer: OTHER GOVERNMENT

## 2022-10-12 VITALS — WEIGHT: 175 LBS | BODY MASS INDEX: 29.88 KG/M2 | HEIGHT: 64 IN

## 2022-10-12 DIAGNOSIS — M47.26 OTHER SPONDYLOSIS WITH RADICULOPATHY, LUMBAR REGION: Primary | ICD-10-CM

## 2022-10-12 PROCEDURE — 99213 OFFICE O/P EST LOW 20 MIN: CPT | Performed by: PHYSICIAN ASSISTANT

## 2022-10-12 NOTE — PROGRESS NOTES
Follow-up: LUMBAR SPINE    Referring Provider:  No ref. provider found    CHIEF COMPLAINT:    Chief Complaint   Patient presents with    Back Pain     LUMBAR         HISTORY OF PRESENT ILLNESS:       Ms. Sridhar Fraga  is a pleasant 50 y.o. female here for follow-up regarding her LBP and left leg pain. Patient states that she did have some improvement with physical therapy but finds that now she is having continued pain over the left greater right buttock which radiates to her knee. She states that her pain in her left buttock and lower back and right 8/10 with radiation into the left greater right thigh have 8/10. She denies any new bowel or bladder dysfunction or saddle anesthesia. She states her pain began when she slipped down carpeted steps impacting her left buttock onto a step on 6/9/2022. Les Pimple Her pain has steadily continued since then. She rates her back pain 0/10, left buttock pain 3/10 with paresthesias into her left leg . She describes the pain as intermittent. Pain is worse with prolonged sitting, rising from a seated position and improved some with changing positions, standing, walking, lying down. The leg pain radiates down the anterior lateral aspect of her left leg to her foot. She has numbness and tingling in her left leg. She denies weakness of her left or right leg and denies bowel or bladder dysfunction. Pain minimally interferes with her sleep. Patient has recently seen her PCP and was placed on a prednisone taper along with a muscle relaxant which has improved her symptoms. Pain Assessment  Location of Pain: Back  Severity of Pain: 8  Quality of Pain: Other (Comment)  Duration of Pain: Other (Comment)  Frequency of Pain: Other (Comment)]  Current/Past Treatment:   Physical Therapy: None  Chiropractic: 2 visits with some benefit  Injection: None  Medications: Prednisone taper and muscle relaxant    Past Medical History:   History reviewed. No pertinent past medical history.      Past Surgical History:     History reviewed. No pertinent surgical history. Current Medications:   No current outpatient medications on file. Allergies:  Patient has no known allergies. Social History:    reports that she has never smoked. She has never used smokeless tobacco.    Family History:   History reviewed. No pertinent family history. REVIEW OF SYSTEMS: Full ROS noted & scanned   CONSTITUTIONAL: Denies unexplained weight loss, fevers, chills or fatigue  NEUROLOGICAL: Denies unsteady gait or progressive weakness  MUSCULOSKELETAL: Denies joint swelling or redness  PSYCHOLOGICAL: Denies anxiety, depression   SKIN: Denies skin changes, delayed healing, rash, itching   HEMATOLOGIC: Denies easy bleeding or bruising  ENDOCRINE: Denies excessive thirst, urination, heat/cold  RESPIRATORY: Denies current dyspnea, cough  GI: Denies nausea, vomiting, diarrhea   : Denies bowel or bladder issues      PHYSICAL EXAM:    Vitals: Height 5' 4.02\" (1.626 m), weight 175 lb (79.4 kg). GENERAL EXAM:  General Apparence: Patient is adequately groomed with no evidence of malnutrition. Orientation: The patient is oriented to time, place and person. Mood & Affect:The patient's mood and affect are appropriate. Vascular: Examination reveals no swelling tenderness in upper or lower extremities. Good capillary refill. Lymphatic: The lymphatic examination bilaterally reveals all areas to be without enlargement or induration  Sensation: Sensation is intact without deficit  Coordination/Balance: Good coordination. LUMBAR/SACRAL EXAMINATION:  Inspection: Local inspection shows no step-off or bruising. Lumbar alignment is normal.  Sagittal and Coronal balance is neutral.      Palpation:   No evidence of tenderness at the midline. No tenderness bilaterally at the paraspinal or trochanters. There is no step-off or paraspinal spasm.    Range of Motion: Lumbar flexion, extension and rotation are mildly limited due to pain.  Strength:   Strength testing is 5/5 in all muscle groups tested. Special Tests:   Straight leg raise and crossed SLR negative. Leg length and pelvis level. Skin: There are no rashes, ulcerations or lesions. Reflexes: Reflexes are symmetrically 2+ at the patellar and ankle tendons. Clonus absent bilaterally at the feet. Gait & station: normal, patient ambulates without assistance    Additional Examinations:   RIGHT LOWER EXTREMITY: Inspection/examination of the right lower extremity does not show any tenderness, deformity or injury. Range of motion is unremarkable. There is no gross instability. There are no rashes, ulcerations or lesions. Strength and tone are normal.  LEFT LOWER EXTREMITY:  Inspection/examination of the left lower extremity does not show any tenderness, deformity or injury. Range of motion is unremarkable. There is no gross instability. There are no rashes, ulcerations or lesions. Strength and tone are normal.    Diagnostic Testing:    X-rays: 2 views of the lumbar spine that were obtained on 8/10/2022 were independently reviewed with the patient's shows well-maintained lumbar lordosis with only mild spondylosis. There is mild disc space narrowing at L4-5 and L5-S1. Impression:   Lumbar spondylosis with radiculopathy      Plan:      We discussed the diagnosis and treatment options including observation, additional oral steroids, physical therapy, epidural injections and additional imaging. Patient would like to proceed with MRI of the lumbar spine. Follow up -after the MRI to review the results of discussed treatment options. Old records were reviewed.     Total evaluation time 23 minutes    Chencho Sanchez PA-C  Board certified by the [address]

## 2022-11-02 ENCOUNTER — OFFICE VISIT (OUTPATIENT)
Dept: ORTHOPEDIC SURGERY | Age: 47
End: 2022-11-02
Payer: OTHER GOVERNMENT

## 2022-11-02 VITALS — WEIGHT: 175 LBS | BODY MASS INDEX: 29.88 KG/M2 | HEIGHT: 64 IN

## 2022-11-02 DIAGNOSIS — M54.16 LUMBAR RADICULOPATHY: Primary | ICD-10-CM

## 2022-11-02 PROCEDURE — 99213 OFFICE O/P EST LOW 20 MIN: CPT | Performed by: PHYSICIAN ASSISTANT

## 2022-11-02 NOTE — PROGRESS NOTES
Follow-up: LUMBAR SPINE    Referring Provider:  No ref. provider found    CHIEF COMPLAINT:    Chief Complaint   Patient presents with    Back Pain     lumbar         HISTORY OF PRESENT ILLNESS:       Ms. Carmela Talley  is a pleasant 50 y.o. female here for follow-up regarding her LBP and left leg pain review her lumbar MRI. Patient states that she did have some improvement with physical therapy but finds that now she is having continued pain over the left greater right buttock which radiates to her knee. She states that her pain in her left buttock and lower back and right 8/10 with radiation into the left greater right thigh have 8/10. She denies any new bowel or bladder dysfunction or saddle anesthesia. She states her pain began when she slipped down carpeted steps impacting her left buttock onto a step on 6/9/2022. Dwayne Hayes Her pain has steadily continued since then. She rates her back pain 0/10, left buttock pain 3/10 with paresthesias into her left leg . She describes the pain as intermittent. Pain is worse with prolonged sitting, rising from a seated position and improved some with changing positions, standing, walking, lying down. The leg pain radiates down the anterior lateral aspect of her left leg to her foot. She has numbness and tingling in her left leg. She denies weakness of her left or right leg and denies bowel or bladder dysfunction. Pain minimally interferes with her sleep. Patient has recently seen her PCP and was placed on a prednisone taper along with a muscle relaxant which has improved her symptoms. Pain Assessment  Location of Pain: Back  Severity of Pain: 6  Quality of Pain: Other (Comment)  Duration of Pain: Other (Comment)  Frequency of Pain: Other (Comment)]  Current/Past Treatment:   Physical Therapy: None  Chiropractic: 2 visits with some benefit  Injection: None  Medications: Prednisone taper and muscle relaxant    Past Medical History:   History reviewed.  No pertinent past medical history. Past Surgical History:     History reviewed. No pertinent surgical history. Current Medications:   No current outpatient medications on file. Allergies:  Patient has no known allergies. Social History:    reports that she has never smoked. She has never used smokeless tobacco.    Family History:   History reviewed. No pertinent family history. REVIEW OF SYSTEMS: Full ROS noted & scanned   CONSTITUTIONAL: Denies unexplained weight loss, fevers, chills or fatigue  NEUROLOGICAL: Denies unsteady gait or progressive weakness  MUSCULOSKELETAL: Denies joint swelling or redness  PSYCHOLOGICAL: Denies anxiety, depression   SKIN: Denies skin changes, delayed healing, rash, itching   HEMATOLOGIC: Denies easy bleeding or bruising  ENDOCRINE: Denies excessive thirst, urination, heat/cold  RESPIRATORY: Denies current dyspnea, cough  GI: Denies nausea, vomiting, diarrhea   : Denies bowel or bladder issues      PHYSICAL EXAM:    Vitals: Height 5' 4.02\" (1.626 m), weight 175 lb (79.4 kg). GENERAL EXAM:  General Apparence: Patient is adequately groomed with no evidence of malnutrition. Orientation: The patient is oriented to time, place and person. Mood & Affect:The patient's mood and affect are appropriate. Vascular: Examination reveals no swelling tenderness in upper or lower extremities. Good capillary refill. Lymphatic: The lymphatic examination bilaterally reveals all areas to be without enlargement or induration  Sensation: Sensation is intact without deficit  Coordination/Balance: Good coordination. LUMBAR/SACRAL EXAMINATION:  Inspection: Local inspection shows no step-off or bruising. Lumbar alignment is normal.  Sagittal and Coronal balance is neutral.      Palpation:   No evidence of tenderness at the midline. No tenderness bilaterally at the paraspinal or trochanters. There is no step-off or paraspinal spasm.    Range of Motion: Lumbar flexion, extension and rotation are mildly limited due to pain. Strength:   Strength testing is 5/5 in all muscle groups tested. Special Tests:   Straight leg raise and crossed SLR negative. Leg length and pelvis level. Skin: There are no rashes, ulcerations or lesions. Reflexes: Reflexes are symmetrically 2+ at the patellar and ankle tendons. Clonus absent bilaterally at the feet. Gait & station: normal, patient ambulates without assistance    Additional Examinations:   RIGHT LOWER EXTREMITY: Inspection/examination of the right lower extremity does not show any tenderness, deformity or injury. Range of motion is unremarkable. There is no gross instability. There are no rashes, ulcerations or lesions. Strength and tone are normal.  LEFT LOWER EXTREMITY:  Inspection/examination of the left lower extremity does not show any tenderness, deformity or injury. Range of motion is unremarkable. There is no gross instability. There are no rashes, ulcerations or lesions. Strength and tone are normal.    Diagnostic Testing:    X-rays: 2 views of the lumbar spine that were obtained on 8/10/2022 were independently reviewed with the patient's shows well-maintained lumbar lordosis with only mild spondylosis. There is mild disc space narrowing at L4-5 and L5-S1. MRI of the lumbar spine that was obtained on 10/22/2022 was independently reviewed with the patient which shows L3-4 biforaminal disc bulge larger on the right with mild right-sided annular tear and minimal abutment of the exiting L3 nerve root. There is no spinal stenosis with mild facet arthrosis. At L4-5 there is small left eccentric disc bulge with annular tear which is indenting the thecal sac contacting the left L5 nerve root in the lateral recess. There is facet arthrosis noted there is no foraminal narrowing noted.     Impression:   L3-4 disc bulge with facet arthrosis  L4-5 disc bulge with left lateral recess impingement  That arthrosis      Plan:      We discussed the diagnosis and treatment options including observation, additional oral steroids, physical therapy, epidural injections and additional imaging. Patient would like to proceed with her continued home exercise program and we did discuss turmeric as a natural anti-inflammatory additional supplement. Educational material was provided for dosage. If she has no durable benefit with these conservative treatment she will contact the office for referral for a spinal injection. Follow up -as needed      Old records were reviewed.     Total evaluation time 24 minutes    Anastasia Grubbs PA-C  Board certified by the [address]

## 2023-05-24 ENCOUNTER — HOSPITAL ENCOUNTER (OUTPATIENT)
Age: 48
Discharge: HOME | End: 2023-05-24
Payer: OTHER GOVERNMENT

## 2023-05-24 LAB
FERRITIN SERPL IA-MCNC: 110.2 NG/ML (ref 15–150)
IRON SATN MFR SERPL: 30 % (ref 15–50)
IRON SERPL-MCNC: 73 UG/DL (ref 37–145)
T4 FREE SERPL-MCNC: 1.2 NG/DL (ref 0.9–1.8)
TIBC SERPL-MCNC: 246 UG/DL (ref 260–445)
TSH SERPL DL<=0.005 MIU/L-ACNC: 1.55 UIU/ML (ref 0.27–4.2)
VIT B12 SERPL-MCNC: 980 PG/ML (ref 211–911)

## 2023-05-24 PROCEDURE — 84439 ASSAY OF FREE THYROXINE: CPT

## 2023-05-24 PROCEDURE — 82728 ASSAY OF FERRITIN: CPT

## 2023-05-24 PROCEDURE — 83550 IRON BINDING TEST: CPT

## 2023-05-24 PROCEDURE — 84443 ASSAY THYROID STIM HORMONE: CPT

## 2023-05-24 PROCEDURE — 82607 VITAMIN B-12: CPT

## 2023-05-24 PROCEDURE — 83540 ASSAY OF IRON: CPT

## 2023-08-25 LAB — MAMMOGRAPHY, EXTERNAL: NORMAL

## 2024-06-14 ENCOUNTER — OFFICE VISIT (OUTPATIENT)
Dept: INTERNAL MEDICINE CLINIC | Age: 49
End: 2024-06-14

## 2024-06-14 VITALS
WEIGHT: 191 LBS | HEART RATE: 68 BPM | DIASTOLIC BLOOD PRESSURE: 70 MMHG | SYSTOLIC BLOOD PRESSURE: 108 MMHG | HEIGHT: 64 IN | BODY MASS INDEX: 32.61 KG/M2 | RESPIRATION RATE: 14 BRPM

## 2024-06-14 DIAGNOSIS — L65.9 HAIR LOSS: ICD-10-CM

## 2024-06-14 DIAGNOSIS — M25.562 ACUTE PAIN OF LEFT KNEE: Primary | ICD-10-CM

## 2024-06-14 DIAGNOSIS — R53.83 FATIGUE, UNSPECIFIED TYPE: ICD-10-CM

## 2024-06-14 DIAGNOSIS — R63.5 WEIGHT GAIN: ICD-10-CM

## 2024-06-14 PROCEDURE — 99386 PREV VISIT NEW AGE 40-64: CPT | Performed by: NURSE PRACTITIONER

## 2024-06-14 RX ORDER — CETIRIZINE HYDROCHLORIDE 10 MG/1
10 TABLET ORAL DAILY
COMMUNITY

## 2024-06-14 RX ORDER — VALACYCLOVIR HYDROCHLORIDE 1 G/1
1000 TABLET, FILM COATED ORAL 2 TIMES DAILY
COMMUNITY

## 2024-06-14 RX ORDER — MONTELUKAST SODIUM 10 MG/1
10 TABLET ORAL NIGHTLY
COMMUNITY

## 2024-06-14 RX ORDER — METHYLPREDNISOLONE 4 MG/1
TABLET ORAL
Qty: 1 KIT | Refills: 0 | Status: SHIPPED | OUTPATIENT
Start: 2024-06-14 | End: 2024-06-20

## 2024-06-14 RX ORDER — ERYTHROMYCIN 5 MG/G
OINTMENT OPHTHALMIC EVERY 6 HOURS
Qty: 1 G | Refills: 0 | Status: SHIPPED | OUTPATIENT
Start: 2024-06-14 | End: 2024-06-21

## 2024-06-14 ASSESSMENT — PATIENT HEALTH QUESTIONNAIRE - PHQ9
SUM OF ALL RESPONSES TO PHQ QUESTIONS 1-9: 1
SUM OF ALL RESPONSES TO PHQ QUESTIONS 1-9: 1
1. LITTLE INTEREST OR PLEASURE IN DOING THINGS: SEVERAL DAYS
SUM OF ALL RESPONSES TO PHQ QUESTIONS 1-9: 1
SUM OF ALL RESPONSES TO PHQ9 QUESTIONS 1 & 2: 1
SUM OF ALL RESPONSES TO PHQ QUESTIONS 1-9: 1
2. FEELING DOWN, DEPRESSED OR HOPELESS: NOT AT ALL

## 2024-06-14 NOTE — PROGRESS NOTES
Vitamin D 25 Hydroxy; Future  - Vitamin B12 & Folate; Future  - Iron and TIBC; Future  - Ferritin; Future    Seasonal allergies  -on Cetirizine, Singulair    HSV outbreaks  - takes Valacyclovir as needed.     H/o histoplasmosis   - at age 6.   - reports no memory of much of her childhood due to this.     H/o depression  - not on any medications.     Return in about 4 months (around 10/14/2024).      Had mammogram Sept 2023  Pap smear 2022.   Had labs Sept 2023 with [facility].   Made appointment with GI to discuss colonoscopy. Dr. Anna.     Genet Mullen FNP-C  6/14/2024

## 2024-06-19 ENCOUNTER — HOSPITAL ENCOUNTER (OUTPATIENT)
Age: 49
Discharge: HOME | End: 2024-06-19
Payer: OTHER GOVERNMENT

## 2024-06-19 DIAGNOSIS — L65.9 HAIR LOSS: ICD-10-CM

## 2024-06-19 DIAGNOSIS — R63.5 WEIGHT GAIN: ICD-10-CM

## 2024-06-19 DIAGNOSIS — R53.83 FATIGUE, UNSPECIFIED TYPE: ICD-10-CM

## 2024-06-19 LAB
25(OH)D3 SERPL-MCNC: 65.4 NG/ML
FERRITIN SERPL IA-MCNC: 285.6 NG/ML (ref 15–150)
FOLATE SERPL-MCNC: >20 NG/ML (ref 4.78–24.2)
IRON SATN MFR SERPL: 30 % (ref 15–50)
IRON SERPL-MCNC: 87 UG/DL (ref 37–145)
TIBC SERPL-MCNC: 286 UG/DL (ref 260–445)
TSH SERPL DL<=0.005 MIU/L-ACNC: 1.85 UIU/ML (ref 0.27–4.2)
VIT B12 SERPL-MCNC: 763 PG/ML (ref 211–911)

## 2024-06-19 PROCEDURE — 82746 ASSAY OF FOLIC ACID SERUM: CPT

## 2024-06-19 PROCEDURE — 82530 CORTISOL FREE: CPT

## 2024-06-19 PROCEDURE — 83540 ASSAY OF IRON: CPT

## 2024-06-19 PROCEDURE — 84443 ASSAY THYROID STIM HORMONE: CPT

## 2024-06-19 PROCEDURE — 36415 COLL VENOUS BLD VENIPUNCTURE: CPT

## 2024-06-19 PROCEDURE — 82607 VITAMIN B-12: CPT

## 2024-06-19 PROCEDURE — 83550 IRON BINDING TEST: CPT

## 2024-06-19 PROCEDURE — 82728 ASSAY OF FERRITIN: CPT

## 2024-06-19 PROCEDURE — 82306 VITAMIN D 25 HYDROXY: CPT

## 2024-06-25 ENCOUNTER — OFFICE VISIT (OUTPATIENT)
Dept: INTERNAL MEDICINE CLINIC | Age: 49
End: 2024-06-25

## 2024-06-25 VITALS
RESPIRATION RATE: 18 BRPM | SYSTOLIC BLOOD PRESSURE: 122 MMHG | BODY MASS INDEX: 32.27 KG/M2 | WEIGHT: 189 LBS | HEIGHT: 64 IN | HEART RATE: 68 BPM | DIASTOLIC BLOOD PRESSURE: 85 MMHG

## 2024-06-25 DIAGNOSIS — K21.9 GASTROESOPHAGEAL REFLUX DISEASE WITHOUT ESOPHAGITIS: Primary | ICD-10-CM

## 2024-06-25 PROCEDURE — 99212 OFFICE O/P EST SF 10 MIN: CPT | Performed by: INTERNAL MEDICINE

## 2024-06-25 RX ORDER — PANTOPRAZOLE SODIUM 40 MG/1
40 TABLET, DELAYED RELEASE ORAL DAILY
Qty: 14 TABLET | Refills: 0 | Status: SHIPPED | OUTPATIENT
Start: 2024-06-25

## 2024-06-25 NOTE — PROGRESS NOTES
Chief Complaint:   Shira Waters is a 49 y.o. female who presents for   Chief Complaint   Patient presents with    Tinnitus       49 y.o. female with hx of seasonal allergies, GERD here for regular f.w    Today reports gerd symptoms and reports interscapular pain. No dyspepsia or belching   needs refill on ppi   Previous EGD noted    Seasonal allergies  -on Cetirizine, Singulair- no acute issues     HSV outbreaks  -takes Valacyclovir as needed.     H/o histoplasmosis   - at age 6.   - reports no memory of much of her childhood due to this.     H/o depression  Resolved and not on any meds      No Known Allergies    Current Outpatient Medications   Medication Sig Dispense Refill    montelukast (SINGULAIR) 10 MG tablet Take 1 tablet by mouth nightly      valACYclovir (VALTREX) 1 g tablet Take 1 tablet by mouth 2 times daily As needed      cetirizine (ZYRTEC) 10 MG tablet Take 1 tablet by mouth daily       No current facility-administered medications for this visit.         Review of Systems    See HPI    Allergies  Patient has no known allergies.      Vitals  Ht 1.626 m (5' 4\")   Wt 85.7 kg (189 lb)   BMI 32.44 kg/m²     Current Medications  Current Outpatient Medications   Medication Sig Dispense Refill    montelukast (SINGULAIR) 10 MG tablet Take 1 tablet by mouth nightly      valACYclovir (VALTREX) 1 g tablet Take 1 tablet by mouth 2 times daily As needed      cetirizine (ZYRTEC) 10 MG tablet Take 1 tablet by mouth daily       No current facility-administered medications for this visit.     There were no vitals filed for this visit.      General: middle aged female, slightly obese,  Awake, alert and oriented. Appears to be not in any distress  Mucous Membranes:  Pink , anicteric  Neck: No JVD, no carotid bruit, no thyromegaly  Chest:  Clear to auscultation bilaterally, no added sounds  Cardiovascular:  RRR S1S2 heard, no murmurs or gallops  Abdomen:  Soft, undistended, non tender, no organomegaly, BS

## 2024-06-28 LAB — CORTIS F SERPL-MCNC: <0.05 UG/DL

## 2024-10-27 SDOH — ECONOMIC STABILITY: INCOME INSECURITY: HOW HARD IS IT FOR YOU TO PAY FOR THE VERY BASICS LIKE FOOD, HOUSING, MEDICAL CARE, AND HEATING?: NOT HARD AT ALL

## 2024-10-27 SDOH — ECONOMIC STABILITY: TRANSPORTATION INSECURITY
IN THE PAST 12 MONTHS, HAS LACK OF TRANSPORTATION KEPT YOU FROM MEETINGS, WORK, OR FROM GETTING THINGS NEEDED FOR DAILY LIVING?: NO

## 2024-10-27 SDOH — ECONOMIC STABILITY: FOOD INSECURITY: WITHIN THE PAST 12 MONTHS, YOU WORRIED THAT YOUR FOOD WOULD RUN OUT BEFORE YOU GOT MONEY TO BUY MORE.: NEVER TRUE

## 2024-10-27 SDOH — ECONOMIC STABILITY: FOOD INSECURITY: WITHIN THE PAST 12 MONTHS, THE FOOD YOU BOUGHT JUST DIDN'T LAST AND YOU DIDN'T HAVE MONEY TO GET MORE.: NEVER TRUE

## 2024-10-28 ENCOUNTER — OFFICE VISIT (OUTPATIENT)
Dept: INTERNAL MEDICINE CLINIC | Age: 49
End: 2024-10-28

## 2024-10-28 VITALS
SYSTOLIC BLOOD PRESSURE: 110 MMHG | DIASTOLIC BLOOD PRESSURE: 60 MMHG | WEIGHT: 189 LBS | HEIGHT: 64 IN | BODY MASS INDEX: 32.27 KG/M2 | RESPIRATION RATE: 14 BRPM | HEART RATE: 75 BPM

## 2024-10-28 DIAGNOSIS — K21.9 GASTROESOPHAGEAL REFLUX DISEASE WITHOUT ESOPHAGITIS: Primary | ICD-10-CM

## 2024-10-28 DIAGNOSIS — R79.89 LOW SERUM CORTISOL LEVEL: ICD-10-CM

## 2024-10-28 DIAGNOSIS — Z13.220 LIPID SCREENING: ICD-10-CM

## 2024-10-28 RX ORDER — AZELASTINE 1 MG/ML
1 SPRAY, METERED NASAL 2 TIMES DAILY
Qty: 60 ML | Refills: 1 | Status: SHIPPED | OUTPATIENT
Start: 2024-10-28

## 2024-10-28 NOTE — PROGRESS NOTES
current facility-administered medications for this visit.     There were no vitals filed for this visit.      General: middle aged female, slightly obese,  Awake, alert and oriented. Appears to be not in any distress  Mucous Membranes:  Pink , anicteric  Neck: No JVD, no carotid bruit, no thyromegaly  Chest:  Clear to auscultation bilaterally, no added sounds  Cardiovascular:  RRR S1S2 heard, no murmurs or gallops  Abdomen:  Soft, undistended, non tender, no organomegaly, BS present  Extremities: No edema or cyanosis. Distal pulses well felt  Neurological : grossly normal  Non focal     Wt Readings from Last 3 Encounters:   10/28/24 85.7 kg (189 lb)   06/25/24 85.7 kg (189 lb)   06/14/24 86.6 kg (191 lb)           Assessment and Plan      Diagnosis Orders   1. Gastroesophageal reflux disease without esophagitis  CBC with Auto Differential    Comprehensive Metabolic Panel      2. Low serum cortisol level  Cortisol AM, Total      3. Lipid screening  CBC with Auto Differential    Comprehensive Metabolic Panel    Lipid, Fasting        Tingling numbness foot - check tsh, A1c,   Recently had abn cortisol level , recheck  Doubt any systemic disease        GERD symptoms, had EGD last year and was on ppi and now off with symptoms of burping and interscapular pain  ]now on pro and pre biotic which helped  Off ppi now       Seasonal allergies- severe  -on Cetirizine 10 mg bid, cut down to 5 mg bid, continue Singulair, can add astelin spray     HSV outbreaks  - takes Valacyclovir as needed.     H/o histoplasmosis   - at age 6.   - reports no memory of much of her childhood due to this.         [facility]  Need colonoscopy   .   Pap smear 2022.   Had labs Sept 2023 with [facility].     Keith Nath MD, 10/28/2024 4:20 PM

## 2024-11-01 DIAGNOSIS — K21.9 GASTROESOPHAGEAL REFLUX DISEASE WITHOUT ESOPHAGITIS: ICD-10-CM

## 2024-11-01 DIAGNOSIS — Z13.220 LIPID SCREENING: ICD-10-CM

## 2024-11-01 DIAGNOSIS — R79.89 LOW SERUM CORTISOL LEVEL: ICD-10-CM

## 2024-11-01 LAB
ALBUMIN SERPL-MCNC: 4.3 G/DL (ref 3.4–5)
ALBUMIN/GLOB SERPL: 2.4 {RATIO} (ref 1.1–2.2)
ALP SERPL-CCNC: 90 U/L (ref 40–129)
ALT SERPL-CCNC: 14 U/L (ref 10–40)
ANION GAP SERPL CALCULATED.3IONS-SCNC: 10 MMOL/L (ref 3–16)
AST SERPL-CCNC: 16 U/L (ref 15–37)
BASOPHILS # BLD: 0 K/UL (ref 0–0.2)
BASOPHILS NFR BLD: 0.6 %
BILIRUB SERPL-MCNC: <0.2 MG/DL (ref 0–1)
BUN SERPL-MCNC: 17 MG/DL (ref 7–20)
CALCIUM SERPL-MCNC: 9.7 MG/DL (ref 8.3–10.6)
CHLORIDE SERPL-SCNC: 103 MMOL/L (ref 99–110)
CHOLEST SERPL-MCNC: 186 MG/DL (ref 0–199)
CO2 SERPL-SCNC: 28 MMOL/L (ref 21–32)
CORTIS AM PEAK SERPL-MCNC: 11.1 UG/DL (ref 4.3–22.4)
CREAT SERPL-MCNC: 0.7 MG/DL (ref 0.6–1.1)
DEPRECATED RDW RBC AUTO: 14.6 % (ref 12.4–15.4)
EOSINOPHIL # BLD: 0.2 K/UL (ref 0–0.6)
EOSINOPHIL NFR BLD: 2.5 %
GFR SERPLBLD CREATININE-BSD FMLA CKD-EPI: >90 ML/MIN/{1.73_M2}
GLUCOSE SERPL-MCNC: 116 MG/DL (ref 70–99)
HCT VFR BLD AUTO: 39.5 % (ref 36–48)
HDLC SERPL-MCNC: 39 MG/DL (ref 40–60)
HGB BLD-MCNC: 13.5 G/DL (ref 12–16)
LDL CHOLESTEROL: 127 MG/DL
LYMPHOCYTES # BLD: 3.1 K/UL (ref 1–5.1)
LYMPHOCYTES NFR BLD: 38.6 %
MCH RBC QN AUTO: 30.3 PG (ref 26–34)
MCHC RBC AUTO-ENTMCNC: 34.1 G/DL (ref 31–36)
MCV RBC AUTO: 88.8 FL (ref 80–100)
MONOCYTES # BLD: 0.4 K/UL (ref 0–1.3)
MONOCYTES NFR BLD: 5.5 %
NEUTROPHILS # BLD: 4.3 K/UL (ref 1.7–7.7)
NEUTROPHILS NFR BLD: 52.8 %
PLATELET # BLD AUTO: 246 K/UL (ref 135–450)
PMV BLD AUTO: 8.4 FL (ref 5–10.5)
POTASSIUM SERPL-SCNC: 4.3 MMOL/L (ref 3.5–5.1)
PROT SERPL-MCNC: 6.1 G/DL (ref 6.4–8.2)
RBC # BLD AUTO: 4.44 M/UL (ref 4–5.2)
SODIUM SERPL-SCNC: 141 MMOL/L (ref 136–145)
TRIGL SERPL-MCNC: 101 MG/DL (ref 0–150)
VLDLC SERPL CALC-MCNC: 20 MG/DL
WBC # BLD AUTO: 8.2 K/UL (ref 4–11)

## 2024-11-04 DIAGNOSIS — R73.9 ELEVATED BLOOD SUGAR: Primary | ICD-10-CM

## 2024-11-04 DIAGNOSIS — R73.9 ELEVATED BLOOD SUGAR: ICD-10-CM

## 2024-11-04 LAB
EST. AVERAGE GLUCOSE BLD GHB EST-MCNC: 111.2 MG/DL
HBA1C MFR BLD: 5.5 %

## 2024-11-11 ENCOUNTER — TELEPHONE (OUTPATIENT)
Dept: INTERNAL MEDICINE CLINIC | Age: 49
End: 2024-11-11

## 2024-11-11 NOTE — TELEPHONE ENCOUNTER
----- Message from Dr. Keith Nath MD sent at 11/11/2024 10:48 AM EST -----  I would recommend to see neurology for further workup of her neuropathy  ----- Message -----  From: Massiel Barksdale  Sent: 11/11/2024   8:01 AM EST  To: Keith Nath MD    Since diabetes is not the issue, do we need to do tests to determine if any of the various conditions that cause peripheral neuropathy are to blame?  There are several people on my dad's side of the family that have been diagnosed with multiple sclerosis.  That is a concern for me.  Also, I was on depo provera for several years and just saw the brain tumor side effects.  What do I need to do to get that checked?  Thank you!  Shira

## 2024-12-04 ENCOUNTER — OFFICE VISIT (OUTPATIENT)
Age: 49
End: 2024-12-04
Payer: OTHER GOVERNMENT

## 2024-12-04 VITALS
HEART RATE: 90 BPM | OXYGEN SATURATION: 97 % | HEIGHT: 64 IN | WEIGHT: 189 LBS | SYSTOLIC BLOOD PRESSURE: 120 MMHG | BODY MASS INDEX: 32.27 KG/M2 | DIASTOLIC BLOOD PRESSURE: 66 MMHG

## 2024-12-04 DIAGNOSIS — R20.9 ABNORMAL SENSATION OF LOWER EXTREMITY: Primary | ICD-10-CM

## 2024-12-04 DIAGNOSIS — M54.17 LUMBOSACRAL RADICULOPATHY: ICD-10-CM

## 2024-12-04 PROCEDURE — 99204 OFFICE O/P NEW MOD 45 MIN: CPT | Performed by: PSYCHIATRY & NEUROLOGY

## 2024-12-04 NOTE — PATIENT INSTRUCTIONS
YOU MUST CONFIRM YOUR APPOINTMENT 1 DAY PRIOR OR IT WILL BE CANCELLED!!   Our office will call you 3 times the day prior to your appointment in an attempt to confirm.  Please return our call ASAP or confirm your appt through [employer] no later than 3 pm the day before your appointment.  If we do not hear back from you by 3 pm to confirm, your appointment will be cancelled & someone will be added into that slot from our wait list.

## 2024-12-04 NOTE — PROGRESS NOTES
Neurology outpatient new visit    Patient name: Shira Waters      Chief Complaint:  Abnormal sensation on both legs.    History of present illness:  This is a 49 years old right-handed female.  The patient is here for evaluation of abnormal sensation on both legs.  The patient reports tingling sensation on both legs especially from her lower back down to both feet.  The onset was about 2 years ago.  The course is intermittent.  The patient also had MRI L-spine in the past.  At that time, it showed mild degree of spinal stenosis from L3-L5.  The patient has been gaining weight from 150 pounds to 195 pounds over the last 2 years.  The patient denies significant back pain at this time.  The patient also reports occasional bladder incontinence.    Past medical history:    Past Medical History:   Diagnosis Date    Allergic rhinitis        Past surgical history:    Past Surgical History:   Procedure Laterality Date    FOOT SURGERY Bilateral     8th grade    LEEP      2002        Medication:    Current Outpatient Medications   Medication Sig Dispense Refill    azelastine (ASTELIN) 0.1 % nasal spray 1 spray by Nasal route 2 times daily Use in each nostril as directed 60 mL 1    montelukast (SINGULAIR) 10 MG tablet Take 1 tablet by mouth nightly      valACYclovir (VALTREX) 1 g tablet Take 1 tablet by mouth 2 times daily As needed      cetirizine (ZYRTEC) 10 MG tablet Take 1 tablet by mouth daily       No current facility-administered medications for this visit.       Allergies:    Allergies as of 12/04/2024    (No Known Allergies)        Social history:     reports that she has never smoked. She has never used smokeless tobacco. She reports current alcohol use. She reports that she does not currently use drugs.     Family history:    Family History   Problem Relation Age of Onset    Heart Attack Mother     Stroke Mother     Diabetes Mother     Diabetes Father         Review of system:  No chest pain, shortness of breath,